# Patient Record
Sex: FEMALE | Race: BLACK OR AFRICAN AMERICAN | NOT HISPANIC OR LATINO
[De-identification: names, ages, dates, MRNs, and addresses within clinical notes are randomized per-mention and may not be internally consistent; named-entity substitution may affect disease eponyms.]

---

## 2021-06-15 ENCOUNTER — APPOINTMENT (OUTPATIENT)
Dept: MRI IMAGING | Facility: CLINIC | Age: 51
End: 2021-06-15

## 2021-09-09 ENCOUNTER — OUTPATIENT (OUTPATIENT)
Dept: OUTPATIENT SERVICES | Facility: HOSPITAL | Age: 51
LOS: 1 days | End: 2021-09-09
Payer: COMMERCIAL

## 2021-09-09 ENCOUNTER — APPOINTMENT (OUTPATIENT)
Dept: MRI IMAGING | Facility: CLINIC | Age: 51
End: 2021-09-09
Payer: COMMERCIAL

## 2021-09-09 DIAGNOSIS — Z98.84 BARIATRIC SURGERY STATUS: Chronic | ICD-10-CM

## 2021-09-09 DIAGNOSIS — Z00.8 ENCOUNTER FOR OTHER GENERAL EXAMINATION: ICD-10-CM

## 2021-09-09 PROCEDURE — 75561 CARDIAC MRI FOR MORPH W/DYE: CPT | Mod: 26

## 2021-09-09 PROCEDURE — A9585: CPT

## 2021-09-09 PROCEDURE — 75561 CARDIAC MRI FOR MORPH W/DYE: CPT

## 2022-04-17 ENCOUNTER — TRANSCRIPTION ENCOUNTER (OUTPATIENT)
Age: 52
End: 2022-04-17

## 2022-04-18 ENCOUNTER — TRANSCRIPTION ENCOUNTER (OUTPATIENT)
Age: 52
End: 2022-04-18

## 2022-04-19 ENCOUNTER — APPOINTMENT (OUTPATIENT)
Dept: DISASTER EMERGENCY | Facility: HOSPITAL | Age: 52
End: 2022-04-19

## 2022-04-19 ENCOUNTER — OUTPATIENT (OUTPATIENT)
Dept: OUTPATIENT SERVICES | Facility: HOSPITAL | Age: 52
LOS: 1 days | End: 2022-04-19

## 2022-04-19 VITALS
RESPIRATION RATE: 16 BRPM | OXYGEN SATURATION: 98 % | TEMPERATURE: 99 F | DIASTOLIC BLOOD PRESSURE: 70 MMHG | HEART RATE: 66 BPM | SYSTOLIC BLOOD PRESSURE: 104 MMHG

## 2022-04-19 VITALS
HEIGHT: 62 IN | DIASTOLIC BLOOD PRESSURE: 71 MMHG | OXYGEN SATURATION: 100 % | WEIGHT: 154.98 LBS | HEART RATE: 77 BPM | SYSTOLIC BLOOD PRESSURE: 107 MMHG | TEMPERATURE: 98 F | RESPIRATION RATE: 16 BRPM

## 2022-04-19 DIAGNOSIS — U07.1 COVID-19: ICD-10-CM

## 2022-04-19 DIAGNOSIS — Z98.84 BARIATRIC SURGERY STATUS: Chronic | ICD-10-CM

## 2022-04-19 RX ORDER — SODIUM CHLORIDE 9 MG/ML
250 INJECTION INTRAMUSCULAR; INTRAVENOUS; SUBCUTANEOUS
Refills: 0 | Status: DISCONTINUED | OUTPATIENT
Start: 2022-04-19 | End: 2022-05-03

## 2022-04-19 RX ORDER — BEBTELOVIMAB 87.5 MG/ML
175 INJECTION, SOLUTION INTRAVENOUS ONCE
Refills: 0 | Status: COMPLETED | OUTPATIENT
Start: 2022-04-19 | End: 2022-04-19

## 2022-04-19 RX ADMIN — BEBTELOVIMAB 175 MILLIGRAM(S): 87.5 INJECTION, SOLUTION INTRAVENOUS at 16:15

## 2022-04-19 RX ADMIN — SODIUM CHLORIDE 50 MILLILITER(S): 9 INJECTION INTRAMUSCULAR; INTRAVENOUS; SUBCUTANEOUS at 16:15

## 2022-04-19 NOTE — MONOCLONAL ANTIBODY INFUSION - HOME MEDICATIONS
oxyCODONE-acetaminophen 5 mg-325 mg oral tablet , 1 tab(s) orally every 8 hours, As Needed -for severe pain MDD:15mg  Calcium 500+D oral tablet, chewable ,  orally once a day  ferrous sulfate 325 mg (65 mg elemental iron) oral tablet ,  orally once a day

## 2022-04-19 NOTE — MONOCLONAL ANTIBODY INFUSION - ASSESSMENT AND PLAN
This is a 51 yrs old female with PMHx of cardiomyopathy and asthma and recently diagnosed with Covid-19 infection who was referred for monoclonal antibody infusion by Provider at Urgent Care  in [place] after testing positive for COVID 19 on 4/18/22.  Patient states he has been experiencing body cahe and cough  since 4/16/22. He denies any CP, SOB, chills, numbness/tingling in b/l limbs, loss of sensation or motor function, N/V/D        PLAN:  - Injection procedure explained to patient   - Consent for monoclonal antibody injection obtained   - Risk & benefits discussed/all questions answered  - Inject Bebtelovimab 175 mg over 1 minute.   - Observe patient for one hour post administration    I have reviewed the Bebtelovimab Emergency Use Authorization (EUA) and I have provided the patient or patient's caregiver with the following information:    1. FDA has authorized emergency use Bebtelovimab, which is not an FDA-approved biological product.  2. The patient or patient's caregiver has the option to accept or refuse administration of Bebtelovimab.  3. The significant known and potential risks and benefits of Bebtelovimab and the extent to which such risks and benefits are unknown.  4. Information on available alternative treatments and risks and benefits of those alternatives.    The patient's COVID monoclonal antibody injection administration went well without any complications. The patient tolerated the treatment without any reactions. Vitals were stable throughout the injection & post-injection administration. The pt denies any CP, fevers, chills, SOB, numbness/tingling in b/l limbs, loss of sensation or motor function, N/V/D while receiving the injection. Patient denies any symptoms an hour after post injection. Vitals were taken post injection and were stable. Pt is medically stable to be discharged home. Discharge instructions were provided to the patient with a fact sheet included. Patient was instructed to self-isolate and use infection control measures (e.g wear mask, isolate, social distance, avoid sharing personal items, clean and disinfect "high touch" surfaces, and frequent handwashing according to the CDC guidelines. The patient was informed on what symptoms to be aware of for the next couple of days, and if there are any issues to call the 24/7 clinical call center. Patient was instructed to follow up with PCP as needed.

## 2022-04-26 ENCOUNTER — APPOINTMENT (OUTPATIENT)
Dept: ORTHOPEDIC SURGERY | Facility: CLINIC | Age: 52
End: 2022-04-26

## 2022-05-06 ENCOUNTER — NON-APPOINTMENT (OUTPATIENT)
Age: 52
End: 2022-05-06

## 2022-05-31 ENCOUNTER — APPOINTMENT (OUTPATIENT)
Dept: ORTHOPEDIC SURGERY | Facility: CLINIC | Age: 52
End: 2022-05-31
Payer: COMMERCIAL

## 2022-05-31 VITALS — BODY MASS INDEX: 29.44 KG/M2 | HEIGHT: 62 IN | WEIGHT: 160 LBS

## 2022-05-31 DIAGNOSIS — Z78.9 OTHER SPECIFIED HEALTH STATUS: ICD-10-CM

## 2022-05-31 PROCEDURE — 99214 OFFICE O/P EST MOD 30 MIN: CPT

## 2022-05-31 NOTE — ASSESSMENT
Per Care Management patient does not have a copy of Advance Directive on file.  Mailed letter with blank Advance Directive.     [FreeTextEntry1] : plan for right carpal tunnel release first

## 2022-05-31 NOTE — HISTORY OF PRESENT ILLNESS
[8] : 8 [10] : 10 [Radiating] : radiating [Tingling] : tingling [Household chores] : household chores [Work] : work [Sleep] : sleep [de-identified] : 5/31/22: injections didn’t help, L hand with n/t intermittent,. worse at night- is up at night. R>>L\par \par 2/22/22: here to f/u b/l hand pain. Had carpal tunnel injections a last visit and felt minimal relief.\par \par 10/25/2021: RHD 52 yo female here with 1 year of right > left hand tingling/numbness/burning. Pt has attempted bracing with no improvement. Pt denies associated weakness or cervical symptoms.\par \par PMH: Cardiomyopathy, asthma.\par Allergies: NKDA.\par Occupation: [employer].\par \par pt states the injection the second time was not helping and the pain has been keeping her up at [employer] [] : Post Surgical Visit: no [FreeTextEntry1] : B/L hands [FreeTextEntry6] : numbness, pins and needles  [FreeTextEntry7] : up the night  [de-identified] :  [de-identified] : wrist brace, injections

## 2022-05-31 NOTE — IMAGING
[de-identified] : Right Hand: Inspection of the hand/wrist is as follows: No swelling, ecchymosis, erythema, lacerations/abrasions,\par rashes, masses, deformity, nail deformity, clubbing of fingers, scissoring of affected finger and atrophy. Palpation of the\par hand/wrist is as follows: No tendreness over wrist or hand, and no palpable masses or nodules. Range of motion of the\par hand/wrist is as follows: full range of motion of wrist, full range of motion of hand, no pain with range of motion and\par good active flexion and extension of all finger joints. Strength testing of the hand/wrist is as follows: Wrist dorsiflexion\par strength is 5/5, Wrist volarflexion strength is 5/5, Grasp strength is 5/5, Finger abductor strength is 5/5 and Pinchstrength is 5/5. Special testing of the hand/wrist is as follows: positive Tinel's test and positive compression\par test negative Finkelstein's test and no triggering. negative Dugan's test, negative TFCC grind test, no instability at\par DRUJ, no opening to radial stress at 1st MCP joint, good end-point with radial stress at 1st MCP joint, no instability with\par varus/valgus or anterior/posterior stressing of finger joints, Neg thumb basal grind test and No opening to ulna directed\par stress thumb MCP negative Tinel over the Cubital Tunnel Neurological testing of the hand/wrist is as\par follows: Decreased sensation to light touch over median nerve distribution. no focal motor deficits, palpable\par radial pulse and good capillary refill in all fingers. \par Left Hand: Inspection of the hand/wrist is as follows: No swelling, ecchymosis, erythema, lacerations/abrasions,\par rashes, masses, deformity, nail deformity, clubbing of fingers, scissoring of affected finger and atrophy. Palpation of the\par hand/wrist is as follows: No tendreness over wrist or hand, and no palpable masses or nodules. Range of motion of the\par hand/wrist is as follows: full range of motion of wrist, full range of motion of hand, no pain with range of motion and\par good active flexion and extension of all finger joints. Strength testing of the hand/wrist is as follows: Wrist dorsiflexion\par strength is 5/5, Wrist volarflexion strength is 5/5, Grasp strength is 5/5, Finger abductor strength is 5/5 and Pinch\par strength is 5/5. Special testing of the hand/wrist is as follows: positive Tinel's test and positive compression\par test negative Finkelstein's test and no triggering. negative Dugan's test, negative TFCC grind test, no instability at\par DRUJ, no opening to radial stress at 1st MCP joint, good end-point with radial stress at 1st MCP joint, no instability with\par varus/valgus or anterior/posterior stressing of finger joints, Neg thumb basal grind test and No opening to ulna directed\par stress thumb MCP Neurological testing of the hand/wrist is as follows: Decreased sensation to light touch over\par median nerve distribution. no focal motor deficits, palpable radial pulse and good capillary refill in all fingers. \par Neck: Neurological testing for the cervical spine is as follows: negative Spurling test

## 2022-06-17 ENCOUNTER — APPOINTMENT (OUTPATIENT)
Age: 52
End: 2022-06-17

## 2022-06-17 PROCEDURE — 64721 CARPAL TUNNEL SURGERY: CPT | Mod: RT

## 2022-06-17 PROCEDURE — 64721 CARPAL TUNNEL SURGERY: CPT | Mod: AS,RT

## 2022-06-17 RX ORDER — ACETAMINOPHEN AND CODEINE 300; 30 MG/1; MG/1
300-30 TABLET ORAL 4 TIMES DAILY
Qty: 12 | Refills: 0 | Status: ACTIVE | COMMUNITY
Start: 2022-06-17 | End: 1900-01-01

## 2022-06-27 ENCOUNTER — APPOINTMENT (OUTPATIENT)
Dept: ORTHOPEDIC SURGERY | Facility: CLINIC | Age: 52
End: 2022-06-27
Payer: COMMERCIAL

## 2022-06-27 VITALS — WEIGHT: 160 LBS | BODY MASS INDEX: 29.44 KG/M2 | HEIGHT: 62 IN

## 2022-06-27 DIAGNOSIS — G56.01 CARPAL TUNNEL SYNDROME, RIGHT UPPER LIMB: ICD-10-CM

## 2022-06-27 PROCEDURE — 99024 POSTOP FOLLOW-UP VISIT: CPT

## 2022-06-27 PROCEDURE — 99214 OFFICE O/P EST MOD 30 MIN: CPT | Mod: 24

## 2022-07-11 ENCOUNTER — APPOINTMENT (OUTPATIENT)
Dept: ORTHOPEDIC SURGERY | Facility: CLINIC | Age: 52
End: 2022-07-11

## 2022-07-11 VITALS — HEIGHT: 62 IN | WEIGHT: 160 LBS | BODY MASS INDEX: 29.44 KG/M2

## 2022-07-11 PROBLEM — G56.01 CARPAL TUNNEL SYNDROME OF RIGHT WRIST: Status: ACTIVE | Noted: 2022-05-31

## 2022-07-11 PROCEDURE — 99214 OFFICE O/P EST MOD 30 MIN: CPT | Mod: 24

## 2022-07-11 NOTE — HISTORY OF PRESENT ILLNESS
[8] : 8 [10] : 10 [Radiating] : radiating [Tingling] : tingling [Household chores] : household chores [Work] : work [Sleep] : sleep [de-identified] : 7/11/2022: Pt doing well s/p right carpal tunnel release. Pt  states she no longer has any numbness to the right upper extremity. \par Pt is now requesting left carpal tunnel release. \par \par 6/27/22: Right carpal tunnel release\par Pt no longer has n/t\par \par 5/31/22: injections didn’t help, L hand with n/t intermittent,. worse at night- is up at night. R>>L\par \par 2/22/22: here to f/u b/l hand pain. Had carpal tunnel injections a last visit and felt minimal relief.\par \par 10/25/2021: RHD 50 yo female here with 1 year of right > left hand tingling/numbness/burning. Pt has attempted bracing with no improvement. Pt denies associated weakness or cervical symptoms.\par \par PMH: Cardiomyopathy, asthma.\par Allergies: NKDA.\par Occupation: [employer].\par \par pt states the injection the second time was not helping and the pain has been keeping her up at [employer] [] : Post Surgical Visit: no [FreeTextEntry1] : B/L hands [FreeTextEntry6] : numbness, pins and needles  [FreeTextEntry7] : up the night  [de-identified] :  [de-identified] : wrist brace, injections [de-identified] : 6/17/22

## 2022-07-11 NOTE — HISTORY OF PRESENT ILLNESS
[de-identified] : 7/11/2022: Pt is requesting left carpal tunnel release. \par Pt doing well s/p right carpal tunnel release. Pt  states she no longer has any numbness to the right upper extremity. \par \par 6/27/22: Right carpal tunnel release\par Pt no longer has n/t\par \par 5/31/22: injections didn’t help, L hand with n/t intermittent,. worse at night- is up at night. R>>L\par \par 2/22/22: here to f/u b/l hand pain. Had carpal tunnel injections a last visit and felt minimal relief.\par \par 10/25/2021: RHD 52 yo female here with 1 year of right > left hand tingling/numbness/burning. Pt has attempted bracing with no improvement. Pt denies associated weakness or cervical symptoms.\par \par PMH: Cardiomyopathy, asthma.\par Allergies: NKDA.\par Occupation: [employer].\par \par pt states the injection the second time was not helping and the pain has been keeping her up at [employer] [8] : 8 [10] : 10 [Radiating] : radiating [Tingling] : tingling [Household chores] : household chores [Work] : work [Sleep] : sleep [] : Post Surgical Visit: no [FreeTextEntry1] : B/L hands [FreeTextEntry6] : numbness, pins and needles  [FreeTextEntry7] : up the night  [de-identified] :  [de-identified] : wrist brace, injections [de-identified] : 6/17/22

## 2022-07-11 NOTE — IMAGING
[de-identified] : Right hand surgical scar is noted with no evidence of infection.\par  and intrinsic strength is 5/5.\par All digits are nvi.\par Mild ttp over the scar only.\par \par Left Hand: Inspection of the hand/wrist is as follows: No swelling, ecchymosis, erythema, lacerations/abrasions,\par rashes, masses, deformity, nail deformity, clubbing of fingers, scissoring of affected finger and atrophy. Palpation of the\par hand/wrist is as follows: No tenderness over wrist or hand, and no palpable masses or nodules. Range of motion of the\par hand/wrist is as follows: full range of motion of wrist, full range of motion of hand, no pain with range of motion and\par good active flexion and extension of all finger joints. Strength testing of the hand/wrist is as follows: Wrist dorsiflexion\par strength is 5/5, Wrist volar flexion strength is 5/5, Grasp strength is 5/5, Finger abductor strength is 5/5 and Pinch\par strength is 5/5. Special ziyad ting of the hand/wrist is as follows: positive Tinel's test and positive compression\par test negative Finkelstein's test and no triggering. negative Dugan's test, negative TFCC grind test, no instability at\par DRUJ, no opening to radial stress at 1st MCP joint, good end-point with radial stress at 1st MCP joint, no instability with\par varus/valgus or anterior/posterior stressing of finger joints, Neg thumb basal grind test and No opening to ulna directed\par stress thumb MCP Neurological testing of the hand/wrist is as follows: Decreased sensation to light touch over\par median nerve distribution. no focal motor deficits, palpable radial pulse and good capillary refill in all fingers. \par Neck: Neurological testing for the cervical spine is as follows: negative Spurling test

## 2022-07-11 NOTE — IMAGING
[de-identified] : Right hand surgical scar is noted with no evidence of infection.\par  and intrinsic strength is 5/5.\par All digits are nvi.\par Mild ttp over the scar only.\par \par left hand no swelling or deformity\par +thenar atrophy\par full actve range of motion\par decreased sensation to the median nerve\par intact ulnar and radial sensation\par ain/pin/ulnar motor intact\par +tinels, phalens and durkans, negative spurling sign\par palpable pulses with CR<2s\par full motion to the elbow, shoulder

## 2022-07-11 NOTE — ASSESSMENT
[FreeTextEntry1] : We discussed the recommendation for left carpal tunnel surgery- We reviewed that the goal of surgery is to prevent worsening and give the nerve a chance to recover. If symptoms are constant there is a chance that the nerve is already too badly damaged and no longer has the potential to recover.Risks, benefits, and alternatives of surgery discussed with patient (and family).Risks including but not limited to infection, blood loss, tendon damage, muscle damage, nerve damage, stiffness, pain, no resolution of symptoms, CRPS, loss of function, potential for secondary surgery, loss of limb or life.Patient understands and was allowed to voice questions or concerns.They agree to surgery.\par Will schedule for left carpal tunnel release in the near future.

## 2022-08-12 ENCOUNTER — APPOINTMENT (OUTPATIENT)
Age: 52
End: 2022-08-12

## 2022-08-12 PROCEDURE — 64721 CARPAL TUNNEL SURGERY: CPT | Mod: AS,79,LT

## 2022-08-12 PROCEDURE — 64721 CARPAL TUNNEL SURGERY: CPT | Mod: 79,LT

## 2022-08-12 RX ORDER — ACETAMINOPHEN AND CODEINE 300; 30 MG/1; MG/1
300-30 TABLET ORAL 4 TIMES DAILY
Qty: 12 | Refills: 0 | Status: ACTIVE | COMMUNITY
Start: 2022-08-12 | End: 1900-01-01

## 2022-08-22 ENCOUNTER — APPOINTMENT (OUTPATIENT)
Dept: ORTHOPEDIC SURGERY | Facility: CLINIC | Age: 52
End: 2022-08-22

## 2022-08-23 ENCOUNTER — APPOINTMENT (OUTPATIENT)
Dept: ORTHOPEDIC SURGERY | Facility: CLINIC | Age: 52
End: 2022-08-23

## 2022-08-23 VITALS — BODY MASS INDEX: 29.44 KG/M2 | HEIGHT: 62 IN | WEIGHT: 160 LBS

## 2022-08-23 DIAGNOSIS — G56.02 CARPAL TUNNEL SYNDROME, LEFT UPPER LIMB: ICD-10-CM

## 2022-08-23 PROCEDURE — 99024 POSTOP FOLLOW-UP VISIT: CPT

## 2022-08-23 NOTE — DISCUSSION/SUMMARY
[de-identified] : She will return to work.. Light duty as she feels she cannot return to work full duty at this time.  See Dr. Smith in 2-3 weeks

## 2022-08-23 NOTE — HISTORY OF PRESENT ILLNESS
[de-identified] : 51 year old female, patient of Dr. Smith, presents 11 days s/p LEFT WRIST CARPAL TUNNEL RELEASE.\par DOS: 8/11/22  [FreeTextEntry1] : L wrist

## 2022-09-13 ENCOUNTER — APPOINTMENT (OUTPATIENT)
Dept: ORTHOPEDIC SURGERY | Facility: CLINIC | Age: 52
End: 2022-09-13

## 2023-06-07 ENCOUNTER — NON-APPOINTMENT (OUTPATIENT)
Age: 53
End: 2023-06-07

## 2023-07-04 ENCOUNTER — EMERGENCY (EMERGENCY)
Facility: HOSPITAL | Age: 53
LOS: 1 days | Discharge: HOME | End: 2023-07-04
Attending: STUDENT IN AN ORGANIZED HEALTH CARE EDUCATION/TRAINING PROGRAM | Admitting: EMERGENCY MEDICINE
Payer: COMMERCIAL

## 2023-07-04 VITALS
RESPIRATION RATE: 18 BRPM | HEART RATE: 41 BPM | OXYGEN SATURATION: 99 % | SYSTOLIC BLOOD PRESSURE: 153 MMHG | TEMPERATURE: 98 F | DIASTOLIC BLOOD PRESSURE: 75 MMHG

## 2023-07-04 DIAGNOSIS — Z98.84 BARIATRIC SURGERY STATUS: Chronic | ICD-10-CM

## 2023-07-04 LAB
ALBUMIN SERPL ELPH-MCNC: 4.1 G/DL — SIGNIFICANT CHANGE UP (ref 3.3–5)
ALP SERPL-CCNC: 64 U/L — SIGNIFICANT CHANGE UP (ref 40–120)
ALT FLD-CCNC: 10 U/L — SIGNIFICANT CHANGE UP (ref 4–33)
ANION GAP SERPL CALC-SCNC: 10 MMOL/L — SIGNIFICANT CHANGE UP (ref 7–14)
AST SERPL-CCNC: 15 U/L — SIGNIFICANT CHANGE UP (ref 4–32)
BASOPHILS # BLD AUTO: 0.02 K/UL — SIGNIFICANT CHANGE UP (ref 0–0.2)
BASOPHILS NFR BLD AUTO: 0.4 % — SIGNIFICANT CHANGE UP (ref 0–2)
BILIRUB SERPL-MCNC: 0.4 MG/DL — SIGNIFICANT CHANGE UP (ref 0.2–1.2)
BUN SERPL-MCNC: 15 MG/DL — SIGNIFICANT CHANGE UP (ref 7–23)
CALCIUM SERPL-MCNC: 9 MG/DL — SIGNIFICANT CHANGE UP (ref 8.4–10.5)
CHLORIDE SERPL-SCNC: 103 MMOL/L — SIGNIFICANT CHANGE UP (ref 98–107)
CO2 SERPL-SCNC: 25 MMOL/L — SIGNIFICANT CHANGE UP (ref 22–31)
CREAT SERPL-MCNC: 0.8 MG/DL — SIGNIFICANT CHANGE UP (ref 0.5–1.3)
EGFR: 90 ML/MIN/1.73M2 — SIGNIFICANT CHANGE UP
EOSINOPHIL # BLD AUTO: 0.09 K/UL — SIGNIFICANT CHANGE UP (ref 0–0.5)
EOSINOPHIL NFR BLD AUTO: 1.8 % — SIGNIFICANT CHANGE UP (ref 0–6)
GLUCOSE SERPL-MCNC: 89 MG/DL — SIGNIFICANT CHANGE UP (ref 70–99)
HCT VFR BLD CALC: 35.5 % — SIGNIFICANT CHANGE UP (ref 34.5–45)
HGB BLD-MCNC: 11.2 G/DL — LOW (ref 11.5–15.5)
IANC: 2.52 K/UL — SIGNIFICANT CHANGE UP (ref 1.8–7.4)
IMM GRANULOCYTES NFR BLD AUTO: 0.2 % — SIGNIFICANT CHANGE UP (ref 0–0.9)
LIDOCAIN IGE QN: 29 U/L — SIGNIFICANT CHANGE UP (ref 7–60)
LYMPHOCYTES # BLD AUTO: 1.76 K/UL — SIGNIFICANT CHANGE UP (ref 1–3.3)
LYMPHOCYTES # BLD AUTO: 36.1 % — SIGNIFICANT CHANGE UP (ref 13–44)
MAGNESIUM SERPL-MCNC: 1.9 MG/DL — SIGNIFICANT CHANGE UP (ref 1.6–2.6)
MCHC RBC-ENTMCNC: 25.7 PG — LOW (ref 27–34)
MCHC RBC-ENTMCNC: 31.5 GM/DL — LOW (ref 32–36)
MCV RBC AUTO: 81.6 FL — SIGNIFICANT CHANGE UP (ref 80–100)
MONOCYTES # BLD AUTO: 0.48 K/UL — SIGNIFICANT CHANGE UP (ref 0–0.9)
MONOCYTES NFR BLD AUTO: 9.8 % — SIGNIFICANT CHANGE UP (ref 2–14)
NEUTROPHILS # BLD AUTO: 2.52 K/UL — SIGNIFICANT CHANGE UP (ref 1.8–7.4)
NEUTROPHILS NFR BLD AUTO: 51.7 % — SIGNIFICANT CHANGE UP (ref 43–77)
NRBC # BLD: 0 /100 WBCS — SIGNIFICANT CHANGE UP (ref 0–0)
NRBC # FLD: 0 K/UL — SIGNIFICANT CHANGE UP (ref 0–0)
NT-PROBNP SERPL-SCNC: 445 PG/ML — HIGH
PLATELET # BLD AUTO: 294 K/UL — SIGNIFICANT CHANGE UP (ref 150–400)
POTASSIUM SERPL-MCNC: 4.1 MMOL/L — SIGNIFICANT CHANGE UP (ref 3.5–5.3)
POTASSIUM SERPL-SCNC: 4.1 MMOL/L — SIGNIFICANT CHANGE UP (ref 3.5–5.3)
PROT SERPL-MCNC: 7.1 G/DL — SIGNIFICANT CHANGE UP (ref 6–8.3)
RBC # BLD: 4.35 M/UL — SIGNIFICANT CHANGE UP (ref 3.8–5.2)
RBC # FLD: 14.1 % — SIGNIFICANT CHANGE UP (ref 10.3–14.5)
SODIUM SERPL-SCNC: 138 MMOL/L — SIGNIFICANT CHANGE UP (ref 135–145)
TROPONIN T, HIGH SENSITIVITY RESULT: 10 NG/L — SIGNIFICANT CHANGE UP
TROPONIN T, HIGH SENSITIVITY RESULT: 9 NG/L — SIGNIFICANT CHANGE UP
WBC # BLD: 4.88 K/UL — SIGNIFICANT CHANGE UP (ref 3.8–10.5)
WBC # FLD AUTO: 4.88 K/UL — SIGNIFICANT CHANGE UP (ref 3.8–10.5)

## 2023-07-04 PROCEDURE — 71046 X-RAY EXAM CHEST 2 VIEWS: CPT | Mod: 26

## 2023-07-04 PROCEDURE — 99223 1ST HOSP IP/OBS HIGH 75: CPT

## 2023-07-04 PROCEDURE — 93010 ELECTROCARDIOGRAM REPORT: CPT

## 2023-07-04 RX ORDER — METOPROLOL TARTRATE 50 MG
50 TABLET ORAL DAILY
Refills: 0 | Status: DISCONTINUED | OUTPATIENT
Start: 2023-07-04 | End: 2023-07-08

## 2023-07-04 RX ORDER — GABAPENTIN 400 MG/1
300 CAPSULE ORAL THREE TIMES A DAY
Refills: 0 | Status: DISCONTINUED | OUTPATIENT
Start: 2023-07-04 | End: 2023-07-08

## 2023-07-04 RX ORDER — SPIRONOLACTONE 25 MG/1
25 TABLET, FILM COATED ORAL DAILY
Refills: 0 | Status: DISCONTINUED | OUTPATIENT
Start: 2023-07-04 | End: 2023-07-08

## 2023-07-04 RX ORDER — SACUBITRIL AND VALSARTAN 24; 26 MG/1; MG/1
1 TABLET, FILM COATED ORAL
Refills: 0 | Status: DISCONTINUED | OUTPATIENT
Start: 2023-07-04 | End: 2023-07-08

## 2023-07-04 NOTE — ED PROVIDER NOTE - CLINICAL SUMMARY MEDICAL DECISION MAKING FREE TEXT BOX
52Y F PMH cardiomyopathy (unknown etiology per patient), HTN, here with 1 week of intermittent chest discomfort and reported fluctuating BPs and HR. States she has an outside cardiologist but does not know what system she is affiliated with. Unknown last provocative testing. Overall well appearing on exam although HR fluctuating between bradycardia and normal rate, noted to have frequent PVCs on EKG which is otherwise sinus. Plan labs including troponin and BNP to eval ACS, CHF, electrolyte abnormalities. Likely CDU vs Tele doc admission for cards eval

## 2023-07-04 NOTE — ED ADULT TRIAGE NOTE - TEMPERATURE IN FAHRENHEIT (DEGREES F)
Physical Therapy Cancellation Note      Chart reviewed; noted pt is currently at HD; will follow as clinical course allows      Christina Pandya PT 98

## 2023-07-04 NOTE — ED PROVIDER NOTE - ATTENDING CONTRIBUTION TO CARE
52-year-old female, past medical history of cardiomyopathy (unclear etiology), and hypertension, presents to ED complaining of 1 week of intermittent chest discomfort and lightheadedness.  Patient reports due to symptoms she has been checking her blood pressure and heart rate.  States that it has been very labile ranging from heart rate 40s-70s and BP as low as 90s/60s.  Patient denies any loss of consciousness, shortness of breath, palpitations, paresthesias, lower extremity edema or any other symptoms.  Patient has not seen her cardiologist in quite some time, and has not had a recent stress/echo.  Patient states her cardiologist is currently on vacation.    VSS.  PHYSICAL EXAM:  GENERAL: non-toxic appearing; in no respiratory distress  HEENT: Atraumatic, Normocephalic, PERRL, EOMs intact b/l w/out deficits, no conjunctival pallor, MMM  NECK: No JVD; FROM  CHEST/LUNG: CTAB no wheezes/rhonchi/rales  HEART: RRR no murmur/gallops/rubs  ABDOMEN: +BS, soft, NT, ND  EXTREMITIES: No LE edema, +2 radial pulses b/l, +2 DP/PT pulses b/l  MUSCULOSKELETAL: FROM of all 4 extremities  NERVOUS SYSTEM:  A&Ox3, No motor deficits or sensory deficits; CNII-XII intact; no focal neurologic deficits  SKIN:  No new rashes    EKG is sinus bradycardia w/ occasional PVCs; no concern for STEMI at this time.    Concern for ACS vs. CHF. Plan to check basic labs, electrolytes, trop, and BNP. Likely will CDU vs. Tele for further cardiac evaluation, such as echocardiogram.

## 2023-07-04 NOTE — ED ADULT NURSE NOTE - CHIEF COMPLAINT QUOTE
Pt AOX4 c/o low heart rate, fluctuates in triage betw 79-42 BPM in triage; pt has Hx of HTN takes Metoprolol but stopped 2 days ago because of the low heart rate, feels chest pain ,weakness and slightly dizzy when HR is low; Hx of asthma

## 2023-07-04 NOTE — ED PROVIDER NOTE - OBJECTIVE STATEMENT
52Y F PMH cardiomyopathy of unclear etiology, HTN, here with 1 week of intermittent chest discomfort. Patient also notes that she has been checking her BP and pulse at home over the last 2 days and states values have been fluctuating, BP as low as 90s/60s, pulse ranging from 40s-70s which is what prompted her to come in to be evaluated. Currently denies chest pain or SOB, palpitations.

## 2023-07-04 NOTE — ED CDU PROVIDER INITIAL DAY NOTE - NEUROLOGICAL, MLM
Alert and oriented, no gross deficits, no gross motor or sensory deficits. ambulating stable on feet.

## 2023-07-04 NOTE — ED ADULT NURSE NOTE - NSFALLUNIVINTERV_ED_ALL_ED
Bed/Stretcher in lowest position, wheels locked, appropriate side rails in place/Call bell, personal items and telephone in reach/Instruct patient to call for assistance before getting out of bed/chair/stretcher/Non-slip footwear applied when patient is off stretcher/[place] to call system/Physically safe environment - no spills, clutter or unnecessary equipment/Purposeful proactive rounding/Room/bathroom lighting operational, light cord in reach

## 2023-07-04 NOTE — ED ADULT NURSE NOTE - OBJECTIVE STATEMENT
Pt c/o generalized weakness x 1 wk along with chest tightness but point at left epigastric area  Pt stated she took her bp and hr, bp 98/40 and hr in 40s. pt has been taking metoprolol and stop taking it a few days ago after consistent low bp. Pt stated she took a marijuana edible yesterday  in hoping to bring up her hr. AO4, ambulatory. Denies pain or acute distress. NAD. EKG shows NSR with PVCs. Placed on cardiac monitor. did not shows bradycardia--NSR with PVCs and occasionally PACs. hx cardiomyopathy, htn, asthma.

## 2023-07-04 NOTE — ED ADULT TRIAGE NOTE - CHIEF COMPLAINT QUOTE
Pt AOX4 c/o low heart rate, fluctuates in triage betw 79-42 BPM in triage; pt has Hx of HTN takes Metoprolol but stopped 2 days ago because of the low heart rate, feels weak and slightly dizzy when HR is low; Hx of asthma Pt AOX4 c/o low heart rate, fluctuates in triage betw 79-42 BPM in triage; pt has Hx of HTN takes Metoprolol but stopped 2 days ago because of the low heart rate, feels chest pain ,weakness and slightly dizzy when HR is low; Hx of asthma

## 2023-07-04 NOTE — ED PROVIDER NOTE - PROGRESS NOTE DETAILS
Jaziel Bills DO (PGY2)  Sign-out received from. Plan includes rpt trop and CDU admission for echo. Patient not endorsing pain at this time. Discussed lab results with patient. will order rpt trop and if stable will admit to CDU. Discussed plan and case with CDU PA.

## 2023-07-04 NOTE — ED CDU PROVIDER INITIAL DAY NOTE - OBJECTIVE STATEMENT
52Y F PMH cardiomyopathy of unclear etiology, HTN, here with 1 week of intermittent chest discomfort. Patient also notes that she has been checking her BP and pulse at home over the last 2 days and states values have been fluctuating, BP as low as 90s/60s, pulse ranging from 40s-70s which is what prompted her to come in to be evaluated. Currently denies chest pain or SOB, palpitations.    CDU THUY Nazario: Agree with above. 52-year-old female with unclear history of cardiomyopathy, hypertension, sciatica presenting with intermittent chest discomfort for the past 1 week.  Patient states her blood pressure has also been fluctuating over the past 3 days lowest blood pressure being 90s over 60s with a heart rate ranging between the 40s to 70s.  Given this finding patient states she came to the emergency room as she was unable to get in touch with her cardiologist at this time.  At time of CDU evaluation patient states she feels well and in normal health.  Patient currently without any chest pain.  Patient denies any dizziness, shortness of breath, palpitations, diaphoresis, jaw pain, arm pain, neck pain, lightheadedness, leg swelling, shortness of breath, dyspnea on exertion, syncope, presyncope, fever, chills, cough, congestion, abdominal pain, nausea, vomiting, diarrhea, rash, neck stiffness pain, headache.    Pt's cardiologist: Dr. Romeo, [phone], [phone], [phone]

## 2023-07-04 NOTE — ED PROVIDER NOTE - NS ED ATTENDING STATEMENT MOD
I have seen and examined this patient and fully participated in the care of this patient as the teaching attending.  The service was shared with the ANGELA.  I reviewed and verified the documentation and independently performed the documented: Attending with

## 2023-07-04 NOTE — ED PROVIDER NOTE - PHYSICAL EXAMINATION
GENERAL: Awake, alert, tearful, nontoxic appearing  HEAD: NC/AT, neck supple, moist mucous membranes  EYES: PERRL, EOM grossly intact, sclera anicteric  LUNGS: normal WOB on RA, CTAB, no wheezes or crackles   CARDIAC: irregular rhythm on monitor and by auscultation, no m/r/g  ABDOMEN: Soft, non tender, non distended, no rebound, no guarding  BACK: No midline spinal tenderness, no CVA tenderness  EXT: No edema, no calf tenderness, no deformities.  NEURO: A&Ox3. Moving all extremities.  SKIN: Warm and dry. No rash.  PSYCH: Normal affect.

## 2023-07-04 NOTE — ED CDU PROVIDER INITIAL DAY NOTE - ATTENDING APP SHARED VISIT CONTRIBUTION OF CARE
52-year-old female, past medical history of cardiomyopathy (unclear etiology), and hypertension, presents to ED complaining of 1 week of intermittent chest discomfort and lightheadedness.  Patient reports due to symptoms she has been checking her blood pressure and heart rate.  States that it has been very labile ranging from heart rate 40s-70s and BP as low as 90s/60s.  Patient denies any loss of consciousness, shortness of breath, palpitations, paresthesias, lower extremity edema or any other symptoms.  Patient has not seen her cardiologist in quite some time, and has not had a recent stress/echo.  Patient states her cardiologist is currently on vacation.    VSS.  PHYSICAL EXAM:  GENERAL: non-toxic appearing; in no respiratory distress  HEENT: Atraumatic, Normocephalic, PERRL, EOMs intact b/l w/out deficits, no conjunctival pallor, MMM  NECK: No JVD; FROM  CHEST/LUNG: CTAB no wheezes/rhonchi/rales  HEART: RRR no murmur/gallops/rubs  ABDOMEN: +BS, soft, NT, ND  EXTREMITIES: No LE edema, +2 radial pulses b/l, +2 DP/PT pulses b/l  MUSCULOSKELETAL: FROM of all 4 extremities  NERVOUS SYSTEM:  A&Ox3, No motor deficits or sensory deficits; CNII-XII intact; no focal neurologic deficits  SKIN:  No new rashes    EKG is sinus bradycardia w/ occasional PVCs; no concern for STEMI at this time.    Concern for ACS. Heart Score 5 (initial trop 9, rpt trop 10). Patient will require cardiac monitoring and echo/stress.

## 2023-07-04 NOTE — ED CDU PROVIDER INITIAL DAY NOTE - NSICDXPASTMEDICALHX_GEN_ALL_CORE_FT
PAST MEDICAL HISTORY:  Anemia     Asthma     History of cardiomyopathy     Hypertension     Sciatica

## 2023-07-04 NOTE — ED CDU PROVIDER INITIAL DAY NOTE - CLINICAL SUMMARY MEDICAL DECISION MAKING FREE TEXT BOX
52-year-old female with unclear history of cardiomyopathy, hypertension, sciatica presenting with intermittent chest discomfort for the past 1 week and with home hypotension and bradycardia      No evidence of volume overload or shock on exam. EKG without signs of active ischemia. EKG without evidence of STEMI. Low suspicion for acute PE (Wells low risk), pneumothorax, thoracic aortic dissection, cardiac effusion / tamponade. Overall, ACS is being considered given higher risk features, history & physical. HEART score: 5 ( initial trop of 9, repeat trop of 10)    Patient will require risk stratification and possible provocative testing.    Plan: cardiac monitor, echo, stress, home meds, ASA. Pt has own cardiologist  Dr. Romeo, office number: [phone], [phone], [phone] 52-year-old female with unclear history of cardiomyopathy, hypertension, sciatica presenting with intermittent chest discomfort for the past 1 week and with home hypotension and bradycardia      No evidence of volume overload or shock on exam. EKG without signs of active ischemia. EKG without evidence of STEMI. Low suspicion for acute PE (Wells low risk), pneumothorax, thoracic aortic dissection, cardiac effusion / tamponade. Overall, ACS is being considered given higher risk features, history & physical. HEART score: 5 ( initial trop of 9, repeat trop of 10)    Patient will require risk stratification and possible provocative testing.    Plan: cardiac monitor, echo, stress, home meds. Pt has own cardiologist  Dr. Romeo, office number: [phone], [phone], [phone]

## 2023-07-05 VITALS
TEMPERATURE: 98 F | OXYGEN SATURATION: 100 % | HEART RATE: 68 BPM | DIASTOLIC BLOOD PRESSURE: 57 MMHG | RESPIRATION RATE: 17 BRPM | SYSTOLIC BLOOD PRESSURE: 99 MMHG

## 2023-07-05 PROCEDURE — 99238 HOSP IP/OBS DSCHRG MGMT 30/<: CPT

## 2023-07-05 PROCEDURE — 93018 CV STRESS TEST I&R ONLY: CPT | Mod: GC

## 2023-07-05 PROCEDURE — 78452 HT MUSCLE IMAGE SPECT MULT: CPT | Mod: 26,ME

## 2023-07-05 PROCEDURE — 93306 TTE W/DOPPLER COMPLETE: CPT | Mod: 26

## 2023-07-05 PROCEDURE — 93016 CV STRESS TEST SUPVJ ONLY: CPT | Mod: GC

## 2023-07-05 RX ORDER — SPIRONOLACTONE 25 MG/1
1 TABLET, FILM COATED ORAL
Qty: 14 | Refills: 0
Start: 2023-07-05 | End: 2023-07-18

## 2023-07-05 RX ORDER — METOPROLOL TARTRATE 50 MG
1 TABLET ORAL
Qty: 14 | Refills: 0
Start: 2023-07-05 | End: 2023-07-18

## 2023-07-05 RX ORDER — SACUBITRIL AND VALSARTAN 24; 26 MG/1; MG/1
1 TABLET, FILM COATED ORAL
Qty: 60 | Refills: 0
Start: 2023-07-05 | End: 2023-08-03

## 2023-07-05 RX ORDER — SACUBITRIL AND VALSARTAN 24; 26 MG/1; MG/1
1 TABLET, FILM COATED ORAL
Qty: 28 | Refills: 0
Start: 2023-07-05 | End: 2023-07-18

## 2023-07-05 RX ADMIN — SPIRONOLACTONE 25 MILLIGRAM(S): 25 TABLET, FILM COATED ORAL at 06:22

## 2023-07-05 RX ADMIN — SACUBITRIL AND VALSARTAN 1 TABLET(S): 24; 26 TABLET, FILM COATED ORAL at 12:55

## 2023-07-05 RX ADMIN — Medication 50 MILLIGRAM(S): at 12:55

## 2023-07-05 NOTE — ED CDU PROVIDER DISPOSITION NOTE - NSFOLLOWUPINSTRUCTIONS_ED_ALL_ED_FT
You were seen in our Emergency Department for chest pain.  Continue your home medications.  Follow up with your PMD in 48-72 hours.   Follow up with your Cardiologist in 48-72 hours for further outpatient cardiac workup.  Bring copies of all paperwork/results to your doctor.  If you develop worsening pain, shortness of breath, dizziness, palpitations, rectal bleeding, numbness, tingling, weakness or any worsening symptoms return to our ED for evaluation.

## 2023-07-05 NOTE — ED CDU PROVIDER SUBSEQUENT DAY NOTE - ATTENDING APP SHARED VISIT CONTRIBUTION OF CARE
I personally saw the patient with the PA and performed a substantive portion of the visit including all aspects of the medical decision making.   52-year-old female with history of cardiomyopathy and heart failure on Entresto, metoprolol, and spironolactone.  Complains of episode of chest pain on and off x1 week.  ED eval reviewed.  Troponin 10/9.  Transferred to CDU for continued monitoring and cardiac assessment with stress test and echo.  Patient reports feeling well currently and has not had recurrent episode of chest pain.  ATTENDING PHYSICAL EXAM  GEN - NAD; well appearing; A+O x3  HEAD - NC/AT; EYES/NOSE - PERRL, EOMI, mucous membranes moist, no discharge; THROAT: Oral cavity and pharynx normal. No inflammation, swelling, exudate, or lesions  NECK: Neck supple, non-tender without lymphadenopathy, no masses, no JVD  PULMONARY - CTA b/l, symmetric breath sounds, no w/r/r  CARDIAC -s1s2, RRR, no M,R,G  ABDOMEN - +NABS, ND, NT, soft, no guarding, no rebound, no masses   BACK - no CVA tenderness, No vertebral or paravertebral tenderness  EXTREMITIES - symmetric pulses, 2+ dp, capillary refill < 2 seconds, no clubbing, no cyanosis, no edema

## 2023-07-05 NOTE — ED CDU PROVIDER DISPOSITION NOTE - WR ORDER ID 1
[id number] Sski Pregnancy And Lactation Text: This medication is Pregnancy Category D and isn't considered safe during pregnancy. It is excreted in breast milk.

## 2023-07-05 NOTE — ED CDU PROVIDER SUBSEQUENT DAY NOTE - HISTORY
52-year-old female with unclear history of cardiomyopathy, hypertension, sciatica presenting with intermittent chest discomfort for the past 1 week.  Patient states her blood pressure has also been fluctuating over the past 3 days lowest blood pressure being 90s over 60s with a heart rate ranging between the 40s to 70s.  Given this finding patient states she came to the emergency room as she was unable to get in touch with her cardiologist at this time.  At time of CDU evaluation patient states she feels well and in normal health.  Patient currently without any chest pain.  Patient denies any dizziness, shortness of breath, palpitations, diaphoresis, jaw pain, arm pain, neck pain, lightheadedness, leg swelling, shortness of breath, dyspnea on exertion, syncope, presyncope, fever, chills, cough, congestion, abdominal pain, nausea, vomiting, diarrhea, rash, neck stiffness pain, headache. Pt's cardiologist: Dr. Romeo, [phone], [phone], [phone]    No acute complaints at bedside. Currently no CP or breathing complaints. No acute tele events at this time. VSS. Plan for stress and echo. Will continue to monitor

## 2023-07-05 NOTE — CONSULT NOTE ADULT - SUBJECTIVE AND OBJECTIVE BOX
C A R D I O L O G Y  *********************    DATE OF SERVICE: 07-05-23    HISTORY OF PRESENT ILLNESS: HPI: Pt is a 52 year old female with a pmh of HFrEF/NICM with coty cardiac cath within last 3 years with no intervention currently following with cardiology at [facility], HTN who presents with 1 week of chest pain.    Patients reports anterior chest tightness. She denies any exertional component denies any SOB. States that she is compliant with meds but has had to take her Entresto once a day rather than twice over the last few days as her supply is low and she hasnt been able to see her cardiologist as she is on vacation. She states she takes lasix as needed if her weight increases. Denies any recent fevers or chills, does report heavy lifting in the form of moving her mother. Denies any orthopnea,PND, LE edema or bendopnea.      PAST MEDICAL & SURGICAL HISTORY:  Anemia  Asthma  History of cardiomyopathy  Hypertension  Sciatica  H/O gastric bypass    MEDICATIONS:  MEDICATIONS  (STANDING):  metoprolol succinate ER 50 milliGRAM(s) Oral daily  sacubitril 49 mG/valsartan 51 mG 1 Tablet(s) Oral two times a day  spironolactone 25 milliGRAM(s) Oral daily      Allergies:  No Known Drug Allergies  shellfish (Hives)    FAMILY HISTORY:  FH: heart disease (Father, Mother)      SOCIAL HISTORY:    [X ] Non-smoker  [ ] Smoker  [ ] Alcohol    FLU VACCINE THIS YEAR STARTS IN AUGUST:  [ ] Yes    [ ] No    IF OVER 65 HAVE YOU EVER HAD A PNA VACCINE:  [ ] Yes    [ ] No       [ ] N/A      REVIEW OF SYSTEMS:  [ ]chest pain  [  ]shortness of breath  [  ]palpitations  [  ]syncope  [ ]near syncope [ ]upper extremity weakness   [ ] lower extremity weakness  [  ]diplopia  [  ]altered mental status   [  ]fevers  [ ]chills [ ]nausea  [ ]vomiting  [  ]dysphagia    [ ]abdominal pain  [ ]melena  [ ]BRBPR    [  ]epistaxis  [  ]rash    [ ]lower extremity edema    [X] All others negative	  [ ] Unable to obtain      LABS:	 	    CARDIAC MARKERS:                       11.2   4.88  )-----------( 294      ( 04 Jul 2023 18:34 )             35.5     Hb Trend: 11.2<--    07-04    138  |  103  |  15  ----------------------------<  89  4.1   |  25  |  0.80    Ca    9.0      04 Jul 2023 18:34  Phos  3.6     07-04  Mg     1.90     07-04    TPro  7.1  /  Alb  4.1  /  TBili  0.4  /  DBili  x   /  AST  15  /  ALT  10  /  AlkPhos  64  07-04    Creatinine Trend: 0.80<--      PHYSICAL EXAM:  T(C): 36.6 (07-05-23 @ 14:10), Max: 36.8 (07-04-23 @ 22:03)  HR: 68 (07-05-23 @ 14:10) (41 - 77)  BP: 99/57 (07-05-23 @ 14:10) (93/54 - 153/75)  RR: 17 (07-05-23 @ 14:10) (16 - 18)  SpO2: 100% (07-05-23 @ 14:10) (99% - 100%)  Wt(kg): --     I&O's Summary      Gen: Appears well in NAD  HEENT:  (-)icterus (-)pallor  CV: N S1 S2 1/6 ENMANUEL (+)2 Pulses B/l  Resp:  Clear to auscultation B/L, normal effort  GI: (+) BS Soft, NT, ND  Lymph:  (-)Edema, (-)obvious lymphadenopathy  Skin: Warm to touch, Normal turgor  Psych: Appropriate mood and affect      TELEMETRY: 	NSR      ECG:  	NSR PVC    RADIOLOGY:         CXR: Clear lUngs    TTE  CONCLUSIONS:  1. Normal mitral valve. Mild-moderate mitral regurgitation.  2. Normal trileaflet aortic valve.  3. Severely dilated left atrium.  LA volume index = 73  cc/m2.  4. Severe left ventricular enlargement.  5. Mild global left ventricular systolic dysfunction.  6. Normal left ventricular diastolic function.  7. Right ventricular enlargement with decreased right  ventricular systolic function.  8. Normal pericardium with no pericardial effusion.    ASSESSMENT/PLAN: Pt is a 52 year old female with a pmh of HFrEF/NICM with coty cardiac cath within last 3 years with no intervention currently following with cardiology at [facility], HTN who presents with 1 week of chest pain.      1. Chest Pain/NICM/HFrEF  - her echo confirms a decreased EF that likely has responded to optimal GDMT. ( DIlated LA and Dilated LV), her RV is enlarged as well. Likely a dilated cardiomyopathy  - reports negative angiogram within last 3 years  - f/u stress  - she is well compensated on exam ( clear lungs, no jvd, warm extremities and no LE edema)  - she denies dizziness, bradycardia noted to 50s seen on tele. Would c/w Toprol 50 at current dose  - c/w Aldactone and Entresto. orthostats are negative  - if stress negative can be discharged anbd follow with her cardiologist. She will need a script for meds     Karen Davis MD  Pager: [phone]      C A R D I O L O G Y  *********************    DATE OF SERVICE: 07-05-23    HISTORY OF PRESENT ILLNESS: HPI: Pt is a 52 year old female with a pmh of HFrEF/NICM with coty cardiac cath within last 3 years with no intervention currently following with cardiology at [facility], HTN who presents with 1 week of chest pain.    Patients reports anterior chest tightness. She denies any exertional component denies any SOB. States that she is compliant with meds but has had to take her Entresto once a day rather than twice over the last few days as her supply is low and she hasnt been able to see her cardiologist as she is on vacation. She states she takes lasix as needed if her weight increases. Denies any recent fevers or chills, does report heavy lifting in the form of moving her mother. Denies any orthopnea,PND, LE edema or bendopnea.      PAST MEDICAL & SURGICAL HISTORY:  Anemia  Asthma  History of cardiomyopathy  Hypertension  Sciatica  H/O gastric bypass    MEDICATIONS:  MEDICATIONS  (STANDING):  metoprolol succinate ER 50 milliGRAM(s) Oral daily  sacubitril 49 mG/valsartan 51 mG 1 Tablet(s) Oral two times a day  spironolactone 25 milliGRAM(s) Oral daily      Allergies:  No Known Drug Allergies  shellfish (Hives)    FAMILY HISTORY:  FH: heart disease (Father, Mother)      SOCIAL HISTORY:    [X ] Non-smoker  [ ] Smoker  [ ] Alcohol    FLU VACCINE THIS YEAR STARTS IN AUGUST:  [ ] Yes    [ ] No    IF OVER 65 HAVE YOU EVER HAD A PNA VACCINE:  [ ] Yes    [ ] No       [ ] N/A      REVIEW OF SYSTEMS:  [ ]chest pain  [  ]shortness of breath  [  ]palpitations  [  ]syncope  [ ]near syncope [ ]upper extremity weakness   [ ] lower extremity weakness  [  ]diplopia  [  ]altered mental status   [  ]fevers  [ ]chills [ ]nausea  [ ]vomiting  [  ]dysphagia    [ ]abdominal pain  [ ]melena  [ ]BRBPR    [  ]epistaxis  [  ]rash    [ ]lower extremity edema    [X] All others negative	  [ ] Unable to obtain      LABS:	 	    CARDIAC MARKERS:                       11.2   4.88  )-----------( 294      ( 04 Jul 2023 18:34 )             35.5     Hb Trend: 11.2<--    07-04    138  |  103  |  15  ----------------------------<  89  4.1   |  25  |  0.80    Ca    9.0      04 Jul 2023 18:34  Phos  3.6     07-04  Mg     1.90     07-04    TPro  7.1  /  Alb  4.1  /  TBili  0.4  /  DBili  x   /  AST  15  /  ALT  10  /  AlkPhos  64  07-04    Creatinine Trend: 0.80<--      PHYSICAL EXAM:  T(C): 36.6 (07-05-23 @ 14:10), Max: 36.8 (07-04-23 @ 22:03)  HR: 68 (07-05-23 @ 14:10) (41 - 77)  BP: 99/57 (07-05-23 @ 14:10) (93/54 - 153/75)  RR: 17 (07-05-23 @ 14:10) (16 - 18)  SpO2: 100% (07-05-23 @ 14:10) (99% - 100%)  Wt(kg): --     I&O's Summary      Gen: Appears well in NAD  HEENT:  (-)icterus (-)pallor  CV: N S1 S2 1/6 ENMANUEL (+)2 Pulses B/l  Resp:  Clear to auscultation B/L, normal effort  GI: (+) BS Soft, NT, ND  Lymph:  (-)Edema, (-)obvious lymphadenopathy  Skin: Warm to touch, Normal turgor  Psych: Appropriate mood and affect      TELEMETRY: 	NSR      ECG:  	NSR     RADIOLOGY:         CXR: Clear lUngs    TTE  CONCLUSIONS:  1. Normal mitral valve. Mild-moderate mitral regurgitation.  2. Normal trileaflet aortic valve.  3. Severely dilated left atrium.  LA volume index = 73  cc/m2.  4. Severe left ventricular enlargement.  5. Mild global left ventricular systolic dysfunction.  6. Normal left ventricular diastolic function.  7. Right ventricular enlargement with decreased right  ventricular systolic function.  8. Normal pericardium with no pericardial effusion.    ASSESSMENT/PLAN: Pt is a 52 year old female with a pmh of HFrEF/NICM with coty cardiac cath within last 3 years with no intervention currently following with cardiology at [facility], HTN who presents with 1 week of chest pain.      1. Chest Pain/NICM/HFrEF  - her echo confirms a decreased EF that likely has responded to optimal GDMT. ( DIlated LA and Dilated LV), her RV is enlarged as well. Likely a dilated cardiomyopathy  - reports negative angiogram within last 3 years  - f/u stress  - she is well compensated on exam ( clear lungs, no jvd, warm extremities and no LE edema)  - she denies dizziness, bradycardia noted to 50s seen on tele. Would c/w Toprol 50 at current dose  - c/w Aldactone and Entresto. orthostats are negative  - if stress shows no inducible ischemia  can be discharged and follow with her cardiologist. She will need a script for meds     Karen Davis MD  Pager: [phone]

## 2023-07-05 NOTE — ED CDU PROVIDER SUBSEQUENT DAY NOTE - CLINICAL SUMMARY MEDICAL DECISION MAKING FREE TEXT BOX
52-year-old female with unclear history of cardiomyopathy, hypertension, sciatica presenting with intermittent chest discomfort for the past 1 week and with home hypotension and bradycardia      No evidence of volume overload or shock on exam. EKG without signs of active ischemia. EKG without evidence of STEMI. Low suspicion for acute PE (Wells low risk), pneumothorax, thoracic aortic dissection, cardiac effusion / tamponade. Overall, ACS is being considered given higher risk features, history & physical. HEART score: 5 ( initial trop of 9, repeat trop of 10)    Patient will require risk stratification and possible provocative testing.    Plan: cardiac monitor, echo, stress, home meds. Pt has own cardiologist  Dr. Romeo, office number: [phone], [phone], [phone]

## 2023-07-05 NOTE — ED CDU PROVIDER DISPOSITION NOTE - CLINICAL COURSE
52-year-old female with unclear history of cardiomyopathy, hypertension, sciatica presenting with intermittent chest discomfort for the past 1 week.  Patient states her blood pressure has also been fluctuating over the past 3 days lowest blood pressure being 90s over 60s with a heart rate ranging between the 40s to 70s. while in ED labs wnl, trop 9,10, ECG NSR nonischemic. . pt transferred to CDU for cardiac monitoring, NST/ECHO and teledoc. NST negative for ischemia, c/w patients baseline cardiomyopathy, ECHO also c/w patients cardiomyopathy. pt had episodes of bradycardia in 40s as well as intermittent bigeminy and trigeminy with frequent PVC's. was asymptomatic during those episodes. case d/w cardio Dr. Karen Davis, recommending orthostatics which were negative (see flowsheets), NST/ECHo reviewed, will dc home with her outpatient cardiologist (BRIAN).

## 2023-07-05 NOTE — ED CDU PROVIDER DISPOSITION NOTE - PATIENT PORTAL LINK FT
You can access the FollowMyHealth Patient Portal offered by [facility] by registering at the following website: [email or web address]. By joining [employer]’s FollowMyHealth portal, you will also be able to view your health information using other applications (apps) compatible with our system.

## 2023-07-05 NOTE — ED CDU PROVIDER SUBSEQUENT DAY NOTE - PROGRESS NOTE DETAILS
Tele event: bradycardia with some PVCs, resolved with no intervention as per RN. Went to evaluate pt who was resting comfortably with no acute complaints - pt states since in observation unit has been comfortable with no complaints. Will continue to monitor. Tele event: bradycardia to around 50s with some PVCs, resolved with no intervention as per RN. Went to evaluate pt who was resting comfortably with no acute complaints - pt states since in observation unit has been comfortable with no complaints. Will continue to monitor.

## 2023-07-05 NOTE — ED CDU PROVIDER DISPOSITION NOTE - ATTENDING APP SHARED VISIT CONTRIBUTION OF CARE
52-year-old female with history of cardiomyopathy and heart failure on Entresto, metoprolol, and spironolactone.  Complains of episode of chest pain on and off x1 week.  ED eval reviewed.  Troponin 10/9.  Transferred to CDU for continued monitoring and cardiac assessment with stress test and echo.  Patient reports feeling well currently and has not had recurrent episode of chest pain.  ATTENDING PHYSICAL EXAM  GEN - NAD; well appearing; A+O x3  HEAD - NC/AT; EYES/NOSE - PERRL, EOMI, mucous membranes moist, no discharge; THROAT: Oral cavity and pharynx normal. No inflammation, swelling, exudate, or lesions  NECK: Neck supple, non-tender without lymphadenopathy, no masses, no JVD  PULMONARY - CTA b/l, symmetric breath sounds, no w/r/r  CARDIAC -s1s2, RRR, no M,R,G  ABDOMEN - +NABS, ND, NT, soft, no guarding, no rebound, no masses   BACK - no CVA tenderness, No vertebral or paravertebral tenderness  EXTREMITIES - symmetric pulses, 2+ dp, capillary refill < 2 seconds, no clubbing, no cyanosis, no edema.  A/P - Cardiology consult reviewed and appreciated.  Echo showing RV and LV dysfunction.  EST results reviewed.  Patient stable for discharge.  Requires close follow-up care.

## 2023-07-06 PROBLEM — I10 ESSENTIAL (PRIMARY) HYPERTENSION: Chronic | Status: ACTIVE | Noted: 2023-07-04

## 2023-07-06 PROBLEM — Z86.79 PERSONAL HISTORY OF OTHER DISEASES OF THE CIRCULATORY SYSTEM: Chronic | Status: ACTIVE | Noted: 2023-07-04

## 2023-07-06 PROBLEM — M54.30 SCIATICA, UNSPECIFIED SIDE: Chronic | Status: ACTIVE | Noted: 2023-07-04

## 2023-07-24 ENCOUNTER — APPOINTMENT (OUTPATIENT)
Dept: CARDIOLOGY | Facility: CLINIC | Age: 53
End: 2023-07-24

## 2024-08-26 ENCOUNTER — NON-APPOINTMENT (OUTPATIENT)
Age: 54
End: 2024-08-26

## 2024-09-19 ENCOUNTER — NON-APPOINTMENT (OUTPATIENT)
Age: 54
End: 2024-09-19

## 2024-09-20 ENCOUNTER — APPOINTMENT (OUTPATIENT)
Dept: ORTHOPEDIC SURGERY | Facility: CLINIC | Age: 54
End: 2024-09-20

## 2024-09-20 VITALS — HEIGHT: 62 IN | BODY MASS INDEX: 32.39 KG/M2 | WEIGHT: 176 LBS

## 2024-09-20 DIAGNOSIS — J45.909 UNSPECIFIED ASTHMA, UNCOMPLICATED: ICD-10-CM

## 2024-09-20 DIAGNOSIS — M17.12 UNILATERAL PRIMARY OSTEOARTHRITIS, LEFT KNEE: ICD-10-CM

## 2024-09-20 DIAGNOSIS — I10 ESSENTIAL (PRIMARY) HYPERTENSION: ICD-10-CM

## 2024-09-20 PROCEDURE — 99204 OFFICE O/P NEW MOD 45 MIN: CPT | Mod: 25

## 2024-09-20 PROCEDURE — 73562 X-RAY EXAM OF KNEE 3: CPT | Mod: LT

## 2024-09-20 PROCEDURE — 20610 DRAIN/INJ JOINT/BURSA W/O US: CPT | Mod: LT

## 2024-09-20 RX ORDER — METOPROLOL TARTRATE 75 MG/1
TABLET, FILM COATED ORAL
Refills: 0 | Status: ACTIVE | COMMUNITY

## 2024-09-20 RX ORDER — SPIRONOLACTONE 50 MG/1
TABLET ORAL
Refills: 0 | Status: ACTIVE | COMMUNITY

## 2024-09-20 RX ORDER — SACUBITRIL AND VALSARTAN 49; 51 MG/1; MG/1
TABLET, FILM COATED ORAL
Refills: 0 | Status: ACTIVE | COMMUNITY

## 2024-09-20 NOTE — IMAGING
[All Views] : anteroposterior, lateral, skyline, and anteroposterior standing [Mild tricompartmental OA medial narrowing] : Mild tricompartmental OA medial narrowing [Moderate patellofemoral OA] : Moderate patellofemoral OA [de-identified] :   ----------------------------------------------------------------------------   Left knee exam:   Skin: no significant pertinent finding  Inspection:     (neg) Effusion     (neg) Malalignment     (neg) Swelling     (neg) Quad atrophy     (neg) J-sign  ROM:     -5 - 120 degrees of flexion. Severe pain with flexion   Tenderness:     (neg) MJLT     (neg) LJLT     (+) Medial patellar facet tenderness     (neg) Lateral patellar facet tenderness     (neg) Crepitus     (+) Patellar grind tenderness     (neg) Patellar tendon     (neg) Quad tendon     Other: posterior knee,   Stability:     (neg) Lachman     (neg) Varus/Valgus instability     (neg) Posterior drawer     (neg) Patellar translation: wnl  Additional tests:     (neg) McMurrays test     (neg) Patellar apprehension     Other:  Strength: 5/5 Q/H/TA/GS/EHL  Neuro: In tact to light touch throughout, DTR's wnl  Vascularity: Extremity warm and well perfused  Gait: Mildly antalgic

## 2024-09-20 NOTE — DISCUSSION/SUMMARY
[de-identified] : Left knee CSI with ultrasound  asp 15 cc blood tinged synovial fluid Plan for PT    ----------------------------------------------------------------------------   All relevant imaging studies pertinent to today's visit, including x-rays, MRI's and/or other advanced imaging studies (CT/etc) were independently interpreted and reviewed with the patient as needed. Implications of the studies together with the patient's clinical picture were discussed to formulate a working diagnosis and management options were detailed.   The patient and/or guardian was advised of the diagnosis.  The natural history of the pathology was explained in full. All questions were answered.  The risks and benefits of conservative and interventional treatment alternatives were explained to the patient  The patient and/or guardian was advised if any advanced diagnostic/imaging study (MRI/CT/etc) is ordered to evaluate potential pathology in the affected area(s), they should follow up in the office to review the results of the study and determine further management that may be indicated.  ----------------------------------------------------------------------------  Large joint corticosteroid injection given: Left knee  Patient indicated for injection after trial of rest, OTC medications including aspirin, Ibuprofen, Aleve etc or prescription NSAIDS, and/or exercises at home and/ or physical therapy without satisfactory response.  Patient has symptoms including pain, swelling, and/or decreased mobility in the joint. The risks, benefits, and alternatives to corticosteroid injection were explained in full to the patient, including but not limited to infection, sepsis, bleeding, scarring, skin discoloration, temporary increase in pain, syncopal episode, failure to resolve symptoms, allergic reaction, symptom recurrence, and elevation of blood sugar in diabetics. Patient understood the risks. All questions were answered. After discussion of options, patient requested an injection.   Oral informed consent was obtained and sterile technique was utilized for the procedure including the preparation of the solutions used for the injection and betadine followed by alcohol prep to the injection site. Anesthesia was given with ethyl chloride sprayed topically. The injection was delivered. Patient tolerated the procedure well.   Post Procedure Instructions: Patient was advised to call if redness, pain, or fever occur and apply ice for 15 min on and 15 min off later today  Medications delivered: Kenalog 10 mg, Lidocaine: 4 cc

## 2024-09-20 NOTE — HISTORY OF PRESENT ILLNESS
[10] : 10 [Dull/Aching] : dull/aching [Sharp] : sharp [Intermittent] : intermittent [Leisure] : leisure [Nothing helps with pain getting better] : Nothing helps with pain getting better [de-identified] : This is Ms. CHA MORALES a 53 year old female who comes in today complaining of left knee pain. Pt states the pain started about 2 weeks ago. No injury. States pain is worse with climbing stairs, flexing/extending knee. She has tried lidocaine cream and OTC creams without significant relief. She saw PCP who rx'd meloxicam, without improvement.  [] : no [FreeTextEntry1] : LEFT KNEE

## 2024-10-09 ENCOUNTER — APPOINTMENT (OUTPATIENT)
Dept: PAIN MANAGEMENT | Facility: CLINIC | Age: 54
End: 2024-10-09

## 2024-11-16 ENCOUNTER — APPOINTMENT (OUTPATIENT)
Dept: PAIN MANAGEMENT | Facility: CLINIC | Age: 54
End: 2024-11-16
Payer: COMMERCIAL

## 2024-11-16 VITALS — BODY MASS INDEX: 32.39 KG/M2 | HEIGHT: 62 IN | WEIGHT: 176 LBS

## 2024-11-16 DIAGNOSIS — M54.16 RADICULOPATHY, LUMBAR REGION: ICD-10-CM

## 2024-11-16 DIAGNOSIS — M51.9 UNSPECIFIED THORACIC, THORACOLUMBAR AND LUMBOSACRAL INTERVERTEBRAL DISC DISORDER: ICD-10-CM

## 2024-11-16 PROCEDURE — 99204 OFFICE O/P NEW MOD 45 MIN: CPT

## 2024-11-16 RX ORDER — DICLOFENAC SODIUM 75 MG/1
75 TABLET, DELAYED RELEASE ORAL
Qty: 60 | Refills: 1 | Status: ACTIVE | COMMUNITY
Start: 2024-11-16 | End: 1900-01-01

## 2025-01-03 ENCOUNTER — APPOINTMENT (OUTPATIENT)
Dept: PAIN MANAGEMENT | Facility: CLINIC | Age: 55
End: 2025-01-03

## 2025-03-25 NOTE — ED CDU PROVIDER SUBSEQUENT DAY NOTE - CONSTITUTIONAL, MLM
Medication:odalis passed protocol.   Last office visit date: 11/25/24  Next appointment scheduled?: No  Number of refills given: 1     normal... Well appearing, awake, alert, oriented to person, place, time/situation and in no apparent distress.